# Patient Record
Sex: FEMALE | Race: WHITE | NOT HISPANIC OR LATINO | URBAN - METROPOLITAN AREA
[De-identification: names, ages, dates, MRNs, and addresses within clinical notes are randomized per-mention and may not be internally consistent; named-entity substitution may affect disease eponyms.]

---

## 2023-08-10 ENCOUNTER — APPOINTMENT (RX ONLY)
Dept: URBAN - METROPOLITAN AREA CLINIC 374 | Facility: CLINIC | Age: 48
Setting detail: DERMATOLOGY
End: 2023-08-10

## 2023-08-10 DIAGNOSIS — L71.8 OTHER ROSACEA: ICD-10-CM | Status: INADEQUATELY CONTROLLED

## 2023-08-10 DIAGNOSIS — B07.8 OTHER VIRAL WARTS: ICD-10-CM

## 2023-08-10 DIAGNOSIS — D22 MELANOCYTIC NEVI: ICD-10-CM

## 2023-08-10 PROBLEM — D22.39 MELANOCYTIC NEVI OF OTHER PARTS OF FACE: Status: ACTIVE | Noted: 2023-08-10

## 2023-08-10 PROCEDURE — ? DEFER

## 2023-08-10 PROCEDURE — ? PRESCRIPTION MEDICATION MANAGEMENT

## 2023-08-10 PROCEDURE — ? COUNSELING

## 2023-08-10 PROCEDURE — ? PRESCRIPTION

## 2023-08-10 PROCEDURE — 99204 OFFICE O/P NEW MOD 45 MIN: CPT

## 2023-08-10 RX ORDER — METRONIDAZOLE 7.5 MG/G
CREAM TOPICAL
Qty: 45 | Refills: 3 | Status: ERX | COMMUNITY
Start: 2023-08-10

## 2023-08-10 RX ADMIN — METRONIDAZOLE: 7.5 CREAM TOPICAL at 00:00

## 2023-08-10 ASSESSMENT — LOCATION SIMPLE DESCRIPTION DERM
LOCATION SIMPLE: LEFT THUMB
LOCATION SIMPLE: LEFT FOREHEAD
LOCATION SIMPLE: LEFT CHEEK

## 2023-08-10 ASSESSMENT — LOCATION DETAILED DESCRIPTION DERM
LOCATION DETAILED: LEFT LATERAL FOREHEAD
LOCATION DETAILED: LEFT DISTAL VENTRAL THUMB
LOCATION DETAILED: LEFT INFERIOR MEDIAL MALAR CHEEK

## 2023-08-10 ASSESSMENT — LOCATION ZONE DERM
LOCATION ZONE: FINGER
LOCATION ZONE: FACE

## 2023-08-10 NOTE — PROCEDURE: DEFER
Introduction Text (Please End With A Colon): The following procedure was deferred: shave biopsy
Detail Level: Detailed
Size Of Lesion In Cm (Optional): 0
Procedure To Be Performed At Next Visit: Biopsy by shave method
Introduction Text (Please End With A Colon): The following procedure was deferred:
Procedure To Be Performed At Next Visit: Liquid nitrogen

## 2023-08-10 NOTE — PROCEDURE: PRESCRIPTION MEDICATION MANAGEMENT
Render In Strict Bullet Format?: No
Initiate Treatment: Metronidazole 0.75% cream to apply to AA of face BID as directed
Detail Level: Zone

## 2024-06-24 ENCOUNTER — APPOINTMENT (RX ONLY)
Dept: URBAN - METROPOLITAN AREA CLINIC 23 | Facility: CLINIC | Age: 49
Setting detail: DERMATOLOGY
End: 2024-06-24

## 2024-06-24 DIAGNOSIS — L71.8 OTHER ROSACEA: ICD-10-CM | Status: WELL CONTROLLED

## 2024-06-24 DIAGNOSIS — L70.0 ACNE VULGARIS: ICD-10-CM

## 2024-06-24 PROBLEM — D48.5 NEOPLASM OF UNCERTAIN BEHAVIOR OF SKIN: Status: ACTIVE | Noted: 2024-06-24

## 2024-06-24 PROCEDURE — ? BIOPSY BY SHAVE METHOD

## 2024-06-24 PROCEDURE — 11102 TANGNTL BX SKIN SINGLE LES: CPT

## 2024-06-24 PROCEDURE — ? COUNSELING

## 2024-06-24 PROCEDURE — ? PRESCRIPTION

## 2024-06-24 PROCEDURE — 99214 OFFICE O/P EST MOD 30 MIN: CPT | Mod: 25

## 2024-06-24 PROCEDURE — ? TREATMENT GOALS

## 2024-06-24 PROCEDURE — ? PRESCRIPTION MEDICATION MANAGEMENT

## 2024-06-24 RX ORDER — TRETINOIN 0.25 MG/G
CREAM TOPICAL
Qty: 45 | Refills: 3 | Status: ERX | COMMUNITY
Start: 2024-06-24

## 2024-06-24 RX ORDER — METRONIDAZOLE 7.5 MG/G
CREAM TOPICAL
Qty: 45 | Refills: 2 | Status: ERX

## 2024-06-24 RX ADMIN — TRETINOIN: 0.25 CREAM TOPICAL at 00:00

## 2024-06-24 ASSESSMENT — LOCATION ZONE DERM: LOCATION ZONE: FACE

## 2024-06-24 ASSESSMENT — LOCATION SIMPLE DESCRIPTION DERM
LOCATION SIMPLE: RIGHT CHEEK
LOCATION SIMPLE: LEFT CHEEK

## 2024-06-24 ASSESSMENT — LOCATION DETAILED DESCRIPTION DERM
LOCATION DETAILED: LEFT MEDIAL MALAR CHEEK
LOCATION DETAILED: RIGHT CENTRAL MALAR CHEEK

## 2024-06-24 NOTE — PROCEDURE: PRESCRIPTION MEDICATION MANAGEMENT
Render In Strict Bullet Format?: No
Continue Regimen: Metronidazole 0.75% cream to apply to AA of face BID as directed
Detail Level: Zone
Initiate Treatment: Retin-A 0.025 % topical cream \\nQuantity: 45.0 g  Days Supply: 30\\nSig: Apply pea sized amount to face at bedtime. Start 3 nights per week then gradually increase use to nightly as tolerated.Ok to use moisturizer on top.

## 2024-06-24 NOTE — PROCEDURE: COUNSELING
Detail Level: Zone
Erythromycin Counseling:  I discussed with the patient the risks of erythromycin including but not limited to GI upset, allergic reaction, drug rash, diarrhea, increase in liver enzymes, and yeast infections.
Topical Retinoid Pregnancy And Lactation Text: This medication is Pregnancy Category C. It is unknown if this medication is excreted in breast milk.
severely reduced LV function
Tazorac Pregnancy And Lactation Text: This medication is not safe during pregnancy. It is unknown if this medication is excreted in breast milk.
Dapsone Counseling: I discussed with the patient the risks of dapsone including but not limited to hemolytic anemia, agranulocytosis, rashes, methemoglobinemia, kidney failure, peripheral neuropathy, headaches, GI upset, and liver toxicity.  Patients who start dapsone require monitoring including baseline LFTs and weekly CBCs for the first month, then every month thereafter.  The patient verbalized understanding of the proper use and possible adverse effects of dapsone.  All of the patient's questions and concerns were addressed.
Doxycycline Counseling:  Patient counseled regarding possible photosensitivity and increased risk for sunburn.  Patient instructed to avoid sunlight, if possible.  When exposed to sunlight, patients should wear protective clothing, sunglasses, and sunscreen.  The patient was instructed to call the office immediately if the following severe adverse effects occur:  hearing changes, easy bruising/bleeding, severe headache, or vision changes.  The patient verbalized understanding of the proper use and possible adverse effects of doxycycline.  All of the patient's questions and concerns were addressed.
Aklief Pregnancy And Lactation Text: It is unknown if this medication is safe to use during pregnancy.  It is unknown if this medication is excreted in breast milk.  Breastfeeding women should use the topical cream on the smallest area of the skin for the shortest time needed while breastfeeding.  Do not apply to nipple and areola.
Include Pregnancy/Lactation Warning?: No
Topical Clindamycin Pregnancy And Lactation Text: This medication is Pregnancy Category B and is considered safe during pregnancy. It is unknown if it is excreted in breast milk.
Birth Control Pills Counseling: Birth Control Pill Counseling: I discussed with the patient the potential side effects of OCPs including but not limited to increased risk of stroke, heart attack, thrombophlebitis, deep venous thrombosis, hepatic adenomas, breast changes, GI upset, headaches, and depression.  The patient verbalized understanding of the proper use and possible adverse effects of OCPs. All of the patient's questions and concerns were addressed.
Topical Sulfur Applications Pregnancy And Lactation Text: This medication is Pregnancy Category C and has an unknown safety profile during pregnancy. It is unknown if this topical medication is excreted in breast milk.
Winlevi Pregnancy And Lactation Text: This medication is considered safe during pregnancy and breastfeeding.
Tetracycline Pregnancy And Lactation Text: This medication is Pregnancy Category D and not consider safe during pregnancy. It is also excreted in breast milk.
Azithromycin Counseling:  I discussed with the patient the risks of azithromycin including but not limited to GI upset, allergic reaction, drug rash, diarrhea, and yeast infections.
Bactrim Counseling:  I discussed with the patient the risks of sulfa antibiotics including but not limited to GI upset, allergic reaction, drug rash, diarrhea, dizziness, photosensitivity, and yeast infections.  Rarely, more serious reactions can occur including but not limited to aplastic anemia, agranulocytosis, methemoglobinemia, blood dyscrasias, liver or kidney failure, lung infiltrates or desquamative/blistering drug rashes.
Spironolactone Pregnancy And Lactation Text: This medication can cause feminization of the male fetus and should be avoided during pregnancy. The active metabolite is also found in breast milk.
High Dose Vitamin A Pregnancy And Lactation Text: High dose vitamin A therapy is contraindicated during pregnancy and breast feeding.
Isotretinoin Pregnancy And Lactation Text: This medication is Pregnancy Category X and is considered extremely dangerous during pregnancy. It is unknown if it is excreted in breast milk.
Benzoyl Peroxide Counseling: Patient counseled that medicine may cause skin irritation and bleach clothing.  In the event of skin irritation, the patient was advised to reduce the amount of the drug applied or use it less frequently.   The patient verbalized understanding of the proper use and possible adverse effects of benzoyl peroxide.  All of the patient's questions and concerns were addressed.
Topical Retinoid counseling:  Patient advised to apply a pea-sized amount only at bedtime and wait 30 minutes after washing their face before applying.  If too drying, patient may add a non-comedogenic moisturizer. The patient verbalized understanding of the proper use and possible adverse effects of retinoids.  All of the patient's questions and concerns were addressed.
Doxycycline Pregnancy And Lactation Text: This medication is Pregnancy Category D and not consider safe during pregnancy. It is also excreted in breast milk but is considered safe for shorter treatment courses.
Erythromycin Pregnancy And Lactation Text: This medication is Pregnancy Category B and is considered safe during pregnancy. It is also excreted in breast milk.
Tazorac Counseling:  Patient advised that medication is irritating and drying.  Patient may need to apply sparingly and wash off after an hour before eventually leaving it on overnight.  The patient verbalized understanding of the proper use and possible adverse effects of tazorac.  All of the patient's questions and concerns were addressed.
Birth Control Pills Pregnancy And Lactation Text: This medication should be avoided if pregnant and for the first 30 days post-partum.
Dapsone Pregnancy And Lactation Text: This medication is Pregnancy Category C and is not considered safe during pregnancy or breast feeding.
Topical Clindamycin Counseling: Patient counseled that this medication may cause skin irritation or allergic reactions.  In the event of skin irritation, the patient was advised to reduce the amount of the drug applied or use it less frequently.   The patient verbalized understanding of the proper use and possible adverse effects of clindamycin.  All of the patient's questions and concerns were addressed.
Topical Sulfur Applications Counseling: Topical Sulfur Counseling: Patient counseled that this medication may cause skin irritation or allergic reactions.  In the event of skin irritation, the patient was advised to reduce the amount of the drug applied or use it less frequently.   The patient verbalized understanding of the proper use and possible adverse effects of topical sulfur application.  All of the patient's questions and concerns were addressed.
Azelaic Acid Counseling: Patient counseled that medicine may cause skin irritation and to avoid applying near the eyes.  In the event of skin irritation, the patient was advised to reduce the amount of the drug applied or use it less frequently.   The patient verbalized understanding of the proper use and possible adverse effects of azelaic acid.  All of the patient's questions and concerns were addressed.
Bactrim Pregnancy And Lactation Text: This medication is Pregnancy Category D and is known to cause fetal risk.  It is also excreted in breast milk.
Tetracycline Counseling: Patient counseled regarding possible photosensitivity and increased risk for sunburn.  Patient instructed to avoid sunlight, if possible.  When exposed to sunlight, patients should wear protective clothing, sunglasses, and sunscreen.  The patient was instructed to call the office immediately if the following severe adverse effects occur:  hearing changes, easy bruising/bleeding, severe headache, or vision changes.  The patient verbalized understanding of the proper use and possible adverse effects of tetracycline.  All of the patient's questions and concerns were addressed. Patient understands to avoid pregnancy while on therapy due to potential birth defects.
Aklief counseling:  Patient advised to apply a pea-sized amount only at bedtime and wait 30 minutes after washing their face before applying.  If too drying, patient may add a non-comedogenic moisturizer.  The most commonly reported side effects including irritation, redness, scaling, dryness, stinging, burning, itching, and increased risk of sunburn.  The patient verbalized understanding of the proper use and possible adverse effects of retinoids.  All of the patient's questions and concerns were addressed.
Winlevi Counseling:  I discussed with the patient the risks of topical clascoterone including but not limited to erythema, scaling, itching, and stinging. Patient voiced their understanding.
Azithromycin Pregnancy And Lactation Text: This medication is considered safe during pregnancy and is also secreted in breast milk.
Spironolactone Counseling: Patient advised regarding risks of diarrhea, abdominal pain, hyperkalemia, birth defects (for female patients), liver toxicity and renal toxicity. The patient may need blood work to monitor liver and kidney function and potassium levels while on therapy. The patient verbalized understanding of the proper use and possible adverse effects of spironolactone.  All of the patient's questions and concerns were addressed.
Sarecycline Counseling: Patient advised regarding possible photosensitivity and discoloration of the teeth, skin, lips, tongue and gums.  Patient instructed to avoid sunlight, if possible.  When exposed to sunlight, patients should wear protective clothing, sunglasses, and sunscreen.  The patient was instructed to call the office immediately if the following severe adverse effects occur:  hearing changes, easy bruising/bleeding, severe headache, or vision changes.  The patient verbalized understanding of the proper use and possible adverse effects of sarecycline.  All of the patient's questions and concerns were addressed.
High Dose Vitamin A Counseling: Side effects reviewed, pt to contact office should one occur.
Minocycline Counseling: Patient advised regarding possible photosensitivity and discoloration of the teeth, skin, lips, tongue and gums.  Patient instructed to avoid sunlight, if possible.  When exposed to sunlight, patients should wear protective clothing, sunglasses, and sunscreen.  The patient was instructed to call the office immediately if the following severe adverse effects occur:  hearing changes, easy bruising/bleeding, severe headache, or vision changes.  The patient verbalized understanding of the proper use and possible adverse effects of minocycline.  All of the patient's questions and concerns were addressed.
Benzoyl Peroxide Pregnancy And Lactation Text: This medication is Pregnancy Category C. It is unknown if benzoyl peroxide is excreted in breast milk.
Azelaic Acid Pregnancy And Lactation Text: This medication is considered safe during pregnancy and breast feeding.
Isotretinoin Counseling: Patient should get monthly blood tests, not donate blood, not drive at night if vision affected, not share medication, and not undergo elective surgery for 6 months after tx completed. Side effects reviewed, pt to contact office should one occur.

## 2024-08-29 ENCOUNTER — TELEPHONE (OUTPATIENT)
Age: 49
End: 2024-08-29

## 2024-08-29 NOTE — TELEPHONE ENCOUNTER
Patient has been diagnosed with BCC on her forehead.  New Patient chart created.      Looking to schedule [facility] to have it removed.      Advised we need pathology, office notes, and color photos of any biopsy done to be able to schedule.    Provided fax number to [facility].

## 2024-09-05 RX ORDER — TRETINOIN 0.25 MG/G
CREAM TOPICAL
Qty: 45 | Refills: 3 | Status: ERX

## 2025-02-05 ENCOUNTER — OFFICE VISIT (OUTPATIENT)
Dept: NEUROLOGY | Facility: CLINIC | Age: 50
End: 2025-02-05
Payer: COMMERCIAL

## 2025-02-05 VITALS
HEART RATE: 68 BPM | SYSTOLIC BLOOD PRESSURE: 123 MMHG | DIASTOLIC BLOOD PRESSURE: 69 MMHG | BODY MASS INDEX: 31.72 KG/M2 | HEIGHT: 61 IN | WEIGHT: 168 LBS

## 2025-02-05 DIAGNOSIS — G43.709 CHRONIC MIGRAINE WITHOUT AURA WITHOUT STATUS MIGRAINOSUS, NOT INTRACTABLE: Primary | ICD-10-CM

## 2025-02-05 DIAGNOSIS — G47.10 HYPERSOMNOLENCE: ICD-10-CM

## 2025-02-05 DIAGNOSIS — G43.109 VERTIGINOUS MIGRAINE: ICD-10-CM

## 2025-02-05 PROBLEM — G47.419 NARCOLEPSY: Status: ACTIVE | Noted: 2022-09-21

## 2025-02-05 PROBLEM — L40.50 PSORIATIC ARTHRITIS (HCC): Chronic | Status: ACTIVE | Noted: 2022-09-21

## 2025-02-05 PROBLEM — N95.1 MENOPAUSAL SYMPTOMS: Status: ACTIVE | Noted: 2025-02-05

## 2025-02-05 PROBLEM — L98.9 SKIN LESION OF FACE: Status: ACTIVE | Noted: 2022-01-06

## 2025-02-05 PROBLEM — E06.3 HYPOTHYROIDISM DUE TO HASHIMOTO'S THYROIDITIS: Status: ACTIVE | Noted: 2021-11-30

## 2025-02-05 PROBLEM — L40.9 PSORIASIS: Status: ACTIVE | Noted: 2022-01-06

## 2025-02-05 PROBLEM — E06.3 HASHIMOTO'S THYROIDITIS: Status: ACTIVE | Noted: 2022-09-21

## 2025-02-05 PROCEDURE — 96372 THER/PROPH/DIAG INJ SC/IM: CPT | Performed by: PHYSICIAN ASSISTANT

## 2025-02-05 PROCEDURE — 99204 OFFICE O/P NEW MOD 45 MIN: CPT | Performed by: PHYSICIAN ASSISTANT

## 2025-02-05 RX ORDER — CONJUGATED ESTROGENS AND MEDROXYPROGESTERONE ACETATE 0.625 (14)
0.6 KIT ORAL DAILY
COMMUNITY

## 2025-02-05 RX ORDER — MELOXICAM 7.5 MG/1
7.5 TABLET ORAL DAILY
COMMUNITY

## 2025-02-05 RX ORDER — MELATONIN 10 MG
10 CAPSULE ORAL AS NEEDED
COMMUNITY

## 2025-02-05 RX ORDER — LEVOTHYROXINE SODIUM 88 UG/1
88 TABLET ORAL DAILY
COMMUNITY

## 2025-02-05 RX ORDER — BACLOFEN 10 MG/1
TABLET ORAL
Qty: 30 TABLET | Refills: 0 | Status: SHIPPED | OUTPATIENT
Start: 2025-02-05

## 2025-02-05 RX ORDER — KETOROLAC TROMETHAMINE 30 MG/ML
60 INJECTION, SOLUTION INTRAMUSCULAR; INTRAVENOUS ONCE
Status: COMPLETED | OUTPATIENT
Start: 2025-02-05 | End: 2025-02-05

## 2025-02-05 RX ORDER — GABAPENTIN 300 MG/1
300 CAPSULE ORAL DAILY
COMMUNITY
Start: 2024-12-02

## 2025-02-05 RX ADMIN — KETOROLAC TROMETHAMINE 60 MG: 30 INJECTION, SOLUTION INTRAMUSCULAR; INTRAVENOUS at 16:46

## 2025-02-05 NOTE — PROGRESS NOTES
"Name: Mar Taveras      : 1975      MRN: [id number]  Encounter Provider: Pattie Meneses PA-C  Encounter Date: 2025   Encounter department: NEUROLOGY [facility]  :  Assessment & Plan  Chronic migraine without aura without status migrainosus, not intractable  Patient was agreeable to try Vyepti for prevention of migraines.  Side effects reviewed.  She is a good candidate for Botox in the future if needed.  Since she has a lot of facial pain with the migraines I recommended baclofen as needed, Toradol injection today to break the cycle.  Nurtec as needed migraine onset.  Side effects of all medications above reviewed.    Orders:    MRI brain IAC wo and w contrast; Future    rimegepant sulfate (NURTEC) 75 mg TBDP; 1 tab prn migraine. No more than one dose per 24 hours.    baclofen 10 mg tablet; 1 tab qhs prn headache and up to q8 hours if tolerated. Caution sedation.    ketorolac (TORADOL) 60 mg/2 mL IM injection 60 mg    Vertiginous migraine  Brain MRI to rule out intracranial abnormality causing symptoms.    Orders:    MRI brain IAC wo and w contrast; Future    Hypersomnolence  The patient thinks that she has narcolepsy, states she was told she did or could have it, however was not formally tested.  Orders:    Ambulatory Referral to Sleep Medicine; Future        The patient should not hesitate to call me prior to her follow up with any questions or concerns.      Patient Instructions   Headache management instructions  - When patient has a moderate to severe headache, they should seek rest, initiate relaxation and apply cold compresses to the head.   - Maintain regular sleep schedule. Adults need at least 7-8 hours of uninterrupted a night.   - Limit over the counter medications such as Tylenol, Ibuprofen, Aleve, Excedrin. (No more than 2- 3 times a week or max 10 a month).  - Maintain headache diary.  Free NICOLASA for a smart phone, which can be used is \"Migraine buddy\"  - Limit caffeine to " "1-2 cups 8 to 16 oz a day or less.  - Avoid dietary trigger. (aged cheese, peanuts, MSG, aspartame and nitrates).  - Patient is to have regular frequent meals to prevent headache onset.    - Please drink at least 64 ounces of water a day to help remain hydrated.      History of Present Illness   HPI     Ms. Mar Taveras is here for consult.  She has psoriasis and psoriatic arthritis seeing [facility] rheumatologist.  MTX caused increased LFTs so stopped that, and uses mobic only now.    Cc: new patient migraines, has a migraine today, has seen a neurologist previously, wasn't happy with the provider. She gets migraines pretty consistently, has tried medications nothing is currently helping. Has arthritis, pain in her back and elbows and arms she states.    Gets dizziness and loss of balance, shaylee if \"throws self off center.\" For ex, if she moves quickly to the side.  Gets tingling in the feet and shins up to knees, and in the hands and arms, also calls it a vibrate feeling or \"humming\" feels-- if it would have a sound.    Migraines/headaches:  Onset: 25 yo  Head injury: none  Current pain: 7/10  Reaches pain level at worst: 9/10  Frequency: 17-18 days per month  Duration: 3-6 hours  Location: generalized, entire head, occipital b/l, facial b/l, around teeth and mouth area  Quality: throbbing, achy, pressure, dull, very intense  Pt states she misses 18 social/family activities per month due to a migraine.  Associated with: nausea, sometimes emesis, insomnia, sore/stiff neck, change in pupil size, eye tearing, concentration problems, decreased appetite, flushing/pale face, ringing ears, prefers a quiet and dark room, lightheaded/dizzy, tingling or numbness in hands and feet, light and sound sensitivity, smell sensitivity, teeth hurt  Triggers: unsure  Aura/ warning: none    Medications tried:  Prevention-  Tried/ failed several medications for migraine, pt does not remember all.  ?topamax  Gabapentin    Abortive-  Nurtec- " "helped once, did not help the second time    Other non-medication therapies or treatments-massage, chiropractor, yoga, physical therapy     Family hx of migraines: grandmother  Family hx of cerebral aneurysms: none    She currently does not work.  She used to be a special .    Brain mri 7/20/18: \"Impression:  1. Minimal nonspecific white matter changes of the right frontal lobe. The differential diagnosis would include migraines, chronic ischemia versus conceivably a demyelinating process 2. No acute intracranial abnormality.  3. No enhancing intracranial lesions.\"    Review of Systems   Constitutional:  Positive for fatigue. Negative for appetite change and fever.   HENT: Negative.  Negative for hearing loss, tinnitus, trouble swallowing and voice change.         Teeth pain   Eyes:  Positive for photophobia. Negative for pain and visual disturbance.   Respiratory: Negative.  Negative for shortness of breath.    Cardiovascular: Negative.  Negative for palpitations.   Gastrointestinal: Negative.  Negative for nausea and vomiting.   Endocrine: Negative.  Negative for cold intolerance.   Genitourinary: Negative.  Negative for dysuria, frequency and urgency.   Musculoskeletal:  Positive for back pain. Negative for gait problem, myalgias, neck pain and neck stiffness.   Skin: Negative.  Negative for rash.   Allergic/Immunologic: Negative.    Neurological:  Positive for headaches. Negative for dizziness, tremors, seizures, syncope, facial asymmetry, speech difficulty, weakness, light-headedness and numbness.   Hematological: Negative.  Does not bruise/bleed easily.   Psychiatric/Behavioral: Negative.  Negative for confusion, hallucinations and sleep disturbance.     I have personally reviewed the MA's review of systems and made changes as necessary.    Pertinent Medical History         Medical History Reviewed by provider this encounter:  Allergies  Meds  Problems  Med Hx  Surg Hx  Fam Hx     .  Past " "Medical History   History reviewed. No pertinent past medical history.  History reviewed. No pertinent surgical history.  History reviewed. No pertinent family history.     Current Outpatient Medications on File Prior to Visit   Medication Sig Dispense Refill    Cholecalciferol 10 MCG /0.028ML LIQD Take 2,000 Units by mouth daily      Cyanocobalamin 1000 MCG/ML LIQD Take 1 tablet by mouth daily      gabapentin (NEURONTIN) 300 mg capsule Take 300 mg by mouth daily      Levoxyl 88 MCG tablet Take 88 mcg by mouth daily      Melatonin 10 MG CAPS Take 10 capsules by mouth as needed      meloxicam (MOBIC) 7.5 mg tablet Take 7.5 mg by mouth daily      metroNIDAZOLE (METROCREAM) 0.75 % cream Apply 45 g topically 2 (two) times a day      Premphase 0.625-5 MG TABS Take 0.6 tablets by mouth daily       No current facility-administered medications on file prior to visit.     Allergies   Allergen Reactions    Penicillins Anaphylaxis, Hives, Itching, Shortness Of Breath and Other (See Comments)    Sulfa Antibiotics Anaphylaxis and Other (See Comments)    Duloxetine Other (See Comments)     Other reaction(s): \"drunk\" feeling -al    Escitalopram Other (See Comments)     Other reaction(s): very sick    Levofloxacin Other (See Comments)    Milk (Cow) Diarrhea    Sulfate Hives and Itching    Gluten Meal - Food Allergy Rash    Latex Hives, Itching, Rash and Swelling     Other reaction(s): Unknown    Medical Tape Rash      Current Outpatient Medications on File Prior to Visit   Medication Sig Dispense Refill    Cholecalciferol 10 MCG /0.028ML LIQD Take 2,000 Units by mouth daily      Cyanocobalamin 1000 MCG/ML LIQD Take 1 tablet by mouth daily      gabapentin (NEURONTIN) 300 mg capsule Take 300 mg by mouth daily      Levoxyl 88 MCG tablet Take 88 mcg by mouth daily      Melatonin 10 MG CAPS Take 10 capsules by mouth as needed      meloxicam (MOBIC) 7.5 mg tablet Take 7.5 mg by mouth daily      metroNIDAZOLE (METROCREAM) 0.75 % cream " "Apply 45 g topically 2 (two) times a day      Premphase 0.625-5 MG TABS Take 0.6 tablets by mouth daily       No current facility-administered medications on file prior to visit.      Social History     Tobacco Use    Smoking status: Not on file    Smokeless tobacco: Not on file   Substance and Sexual Activity    Alcohol use: Not on file    Drug use: Not on file    Sexual activity: Not on file        Objective   /69 (BP Location: Left arm, Patient Position: Sitting, Cuff Size: Standard)   Pulse 68   Ht 5' 1\" (1.549 m)   Wt 76.2 kg (168 lb)   BMI 31.74 kg/m²     Physical Exam  Neurological Exam  Vital signs reviewed.  Well developed, well nourished.  Mood and affect are pleasant.  Speech is fluent and articulate.  Head: Normocephalic, atraumatic  Neck: Neck flexors 5/5  CN 2-12: intact and symmetric, including EOMs which are normal b/l and PERRL  MSK: 5/5 t/o. ROM normal x all 4 extr.  Reflexes: 2+ and symmetric in all 4 extr.  Coordination: Nml x4 extr.  Gait: Steady normal gait.        Administrative Statements   I have spent a total time of 45 minutes in caring for this patient on the day of the visit/encounter including Diagnostic results, Prognosis, Risks and benefits of tx options, Instructions for management, Patient and family education, Importance of tx compliance, Risk factor reductions, Impressions, Counseling / Coordination of care, Documenting in the medical record, Reviewing / ordering tests, medicine, procedures  , and Obtaining or reviewing history  . Topics discussed with the patient / family include symptom assessment and management, medication review, medication adjustment, psychosocial support, and supportive listening.  "

## 2025-02-05 NOTE — ASSESSMENT & PLAN NOTE
Brain MRI to rule out intracranial abnormality causing symptoms.    Orders:    MRI brain IAC wo and w contrast; Future

## 2025-02-05 NOTE — ASSESSMENT & PLAN NOTE
Patient was agreeable to try Vyepti for prevention of migraines.  Side effects reviewed.  She is a good candidate for Botox in the future if needed.  Since she has a lot of facial pain with the migraines I recommended baclofen as needed, Toradol injection today to break the cycle.  Nurtec as needed migraine onset.  Side effects of all medications above reviewed.    Orders:    MRI brain IAC wo and w contrast; Future    rimegepant sulfate (NURTEC) 75 mg TBDP; 1 tab prn migraine. No more than one dose per 24 hours.    baclofen 10 mg tablet; 1 tab qhs prn headache and up to q8 hours if tolerated. Caution sedation.    ketorolac (TORADOL) 60 mg/2 mL IM injection 60 mg

## 2025-02-07 ENCOUNTER — TELEPHONE (OUTPATIENT)
Dept: NEUROLOGY | Facility: CLINIC | Age: 50
End: 2025-02-07

## 2025-02-07 PROBLEM — G47.10 HYPERSOMNOLENCE: Status: ACTIVE | Noted: 2025-02-07

## 2025-02-07 RX ORDER — SODIUM CHLORIDE 9 MG/ML
20 INJECTION, SOLUTION INTRAVENOUS ONCE
OUTPATIENT
Start: 2025-02-17

## 2025-02-07 NOTE — ASSESSMENT & PLAN NOTE
The patient thinks that she has narcolepsy, states she was told she did or could have it, however was not formally tested.  Orders:    Ambulatory Referral to Sleep Medicine; Future

## 2025-02-07 NOTE — PATIENT INSTRUCTIONS
"Headache management instructions  - When patient has a moderate to severe headache, they should seek rest, initiate relaxation and apply cold compresses to the head.   - Maintain regular sleep schedule. Adults need at least 7-8 hours of uninterrupted a night.   - Limit over the counter medications such as Tylenol, Ibuprofen, Aleve, Excedrin. (No more than 2- 3 times a week or max 10 a month).  - Maintain headache diary.  Free NICOLASA for a smart phone, which can be used is \"Migraine john\"  - Limit caffeine to 1-2 cups 8 to 16 oz a day or less.  - Avoid dietary trigger. (aged cheese, peanuts, MSG, aspartame and nitrates).  - Patient is to have regular frequent meals to prevent headache onset.    - Please drink at least 64 ounces of water a day to help remain hydrated.   "

## 2025-02-07 NOTE — TELEPHONE ENCOUNTER
----- Message from Mar SANTA sent at 2/7/2025  9:26 AM EST -----    ----- Message -----  From: Pattie Meneses PA-C  Sent: 2/7/2025   8:08 AM EST  To: Heather Rigler; #    Ordering vyepti, please help with auth. Thanks!

## 2025-02-12 NOTE — TELEPHONE ENCOUNTER
Per benefits investigation:  Vyepti requires authorization.   [facility] [place] [facility] is in network.    [id number]  bin: [id number]  pcn: [id number]    Key: [id number] - member not found    PA form found on [employer] website.

## 2025-02-20 NOTE — TELEPHONE ENCOUNTER
Vyepti denied due to no preferred tried/failed medications (Emgality, Aimovig, Ajovy). Spoke with the patient and is agreeable to any of these medications. Please order.

## 2025-06-10 ENCOUNTER — TELEPHONE (OUTPATIENT)
Age: 50
End: 2025-06-10

## 2025-06-10 NOTE — TELEPHONE ENCOUNTER
Appointment canceled for Mar Taveras ([id number])  Visit type: OFFICE VISIT LONG [facility]  6/23/2025 2:00 PM (30 minutes) with Pattie Meneses PA-C in [facility]     Reason for cancellation: Canceled via [place]      Called pt and LMOM

## 2025-08-13 ENCOUNTER — TELEPHONE (OUTPATIENT)
Dept: NEUROLOGY | Facility: CLINIC | Age: 50
End: 2025-08-13